# Patient Record
Sex: MALE | URBAN - METROPOLITAN AREA
[De-identification: names, ages, dates, MRNs, and addresses within clinical notes are randomized per-mention and may not be internally consistent; named-entity substitution may affect disease eponyms.]

---

## 2022-08-24 ENCOUNTER — APPOINTMENT (RX ONLY)
Dept: URBAN - METROPOLITAN AREA CLINIC 55 | Facility: CLINIC | Age: 42
Setting detail: DERMATOLOGY
End: 2022-08-24

## 2022-08-24 DIAGNOSIS — D22 MELANOCYTIC NEVI: ICD-10-CM | Status: STABLE

## 2022-08-24 DIAGNOSIS — L28.1 PRURIGO NODULARIS: ICD-10-CM

## 2022-08-24 PROBLEM — D22.5 MELANOCYTIC NEVI OF TRUNK: Status: ACTIVE | Noted: 2022-08-24

## 2022-08-24 PROCEDURE — ? ADDITIONAL NOTES

## 2022-08-24 PROCEDURE — ? COUNSELING

## 2022-08-24 PROCEDURE — ? DEFER

## 2022-08-24 PROCEDURE — 99203 OFFICE O/P NEW LOW 30 MIN: CPT

## 2022-08-24 ASSESSMENT — LOCATION SIMPLE DESCRIPTION DERM
LOCATION SIMPLE: CHEST
LOCATION SIMPLE: RIGHT HAND

## 2022-08-24 ASSESSMENT — LOCATION ZONE DERM
LOCATION ZONE: HAND
LOCATION ZONE: TRUNK

## 2022-08-24 ASSESSMENT — LOCATION DETAILED DESCRIPTION DERM
LOCATION DETAILED: LEFT LATERAL SUPERIOR CHEST
LOCATION DETAILED: RIGHT RADIAL DORSAL HAND

## 2022-08-24 NOTE — PROCEDURE: DEFER
X Size Of Lesion In Cm (Optional): 0
Instructions (Optional): Defer; [id number], 
Introduction Text (Please End With A Colon): Defer; [id number], 
Detail Level: Detailed

## 2022-08-24 NOTE — PROCEDURE: MIPS QUALITY
Quality 431: Preventive Care And Screening: Unhealthy Alcohol Use - Screening: Patient not identified as an unhealthy alcohol user when screened for unhealthy alcohol use using a systematic screening method
Detail Level: Generalized
Quality 226: Preventive Care And Screening: Tobacco Use: Screening And Cessation Intervention: Tobacco Screening not Performed for Unknown Reasons
Quality 130: Documentation Of Current Medications In The Medical Record: Current Medications Documented

## 2022-08-24 NOTE — PROCEDURE: ADDITIONAL NOTES
Render Risk Assessment In Note?: no
Additional Notes: Pt will be back in 2 weeks to get kenalog 10 injected on site 0.1cc
Detail Level: Simple

## 2022-09-07 ENCOUNTER — APPOINTMENT (RX ONLY)
Dept: URBAN - METROPOLITAN AREA CLINIC 55 | Facility: CLINIC | Age: 42
Setting detail: DERMATOLOGY
End: 2022-09-07

## 2022-09-07 DIAGNOSIS — L30.9 DERMATITIS, UNSPECIFIED: ICD-10-CM

## 2022-09-22 ENCOUNTER — APPOINTMENT (RX ONLY)
Dept: URBAN - METROPOLITAN AREA CLINIC 55 | Facility: CLINIC | Age: 42
Setting detail: DERMATOLOGY
End: 2022-09-22

## 2022-09-22 DIAGNOSIS — L81.4 OTHER MELANIN HYPERPIGMENTATION: ICD-10-CM | Status: STABLE

## 2022-09-22 DIAGNOSIS — L28.1 PRURIGO NODULARIS: ICD-10-CM

## 2022-09-22 PROCEDURE — 11900 INJECT SKIN LESIONS </W 7: CPT

## 2022-09-22 PROCEDURE — 99212 OFFICE O/P EST SF 10 MIN: CPT | Mod: 25

## 2022-09-22 PROCEDURE — ? INTRALESIONAL KENALOG

## 2022-09-22 PROCEDURE — ? COUNSELING

## 2022-09-22 ASSESSMENT — LOCATION DETAILED DESCRIPTION DERM
LOCATION DETAILED: RIGHT RADIAL DORSAL HAND
LOCATION DETAILED: LEFT POSTERIOR SHOULDER

## 2022-09-22 ASSESSMENT — LOCATION ZONE DERM
LOCATION ZONE: HAND
LOCATION ZONE: ARM

## 2022-09-22 ASSESSMENT — LOCATION SIMPLE DESCRIPTION DERM
LOCATION SIMPLE: LEFT SHOULDER
LOCATION SIMPLE: RIGHT HAND

## 2022-09-22 NOTE — HPI: EVALUATION OF SKIN LESION(S)
What Type Of Note Output Would You Prefer (Optional)?: Standard Output
How Severe Are Your Spot(S)?: mild
Have Your Spot(S) Been Treated In The Past?: has been treated
Hpi Title: Evaluation of Skin Lesions
When Was It Treated?: 9/7/22

## 2022-09-22 NOTE — PROCEDURE: INTRALESIONAL KENALOG
Which Kenalog Vial Was Used?: Kenalog 40 mg/ml (1 ml vial)
How Many Mls Were Removed From The 40 Mg/Ml (1ml) Vial When Preparing The Injectable Solution?: 0
Validate Note Data When Using Inventory: Yes
Detail Level: Detailed
Concentration Of Solution Injected (Mg/Ml): 40.0
Ndc# For Kenalog Only: 48707329466
Show Inventory Tab: Hide
Total Volume Injected (Ccs- Only Use Numbers And Decimals): 0.5
Kenalog Preparation: Kenalog
Administered By (Optional): [facility]
Include Z78.9 (Other Specified Conditions Influencing Health Status) As An Associated Diagnosis?: No
Medical Necessity Clause: This procedure was medically necessary because the lesions that were treated were: causing patient emotional distress
Consent: The risks of atrophy were reviewed with the patient.
Treatment Number (Optional): 2

## 2022-10-25 ENCOUNTER — APPOINTMENT (RX ONLY)
Dept: URBAN - METROPOLITAN AREA CLINIC 55 | Facility: CLINIC | Age: 42
Setting detail: DERMATOLOGY
End: 2022-10-25

## 2022-10-25 DIAGNOSIS — L28.1 PRURIGO NODULARIS: ICD-10-CM

## 2022-10-25 PROCEDURE — ? COUNSELING

## 2022-10-25 PROCEDURE — ? INTRALESIONAL KENALOG

## 2022-10-25 PROCEDURE — 11900 INJECT SKIN LESIONS </W 7: CPT

## 2022-10-25 ASSESSMENT — LOCATION DETAILED DESCRIPTION DERM: LOCATION DETAILED: RIGHT RADIAL DORSAL HAND

## 2022-10-25 ASSESSMENT — LOCATION SIMPLE DESCRIPTION DERM: LOCATION SIMPLE: RIGHT HAND

## 2022-10-25 ASSESSMENT — LOCATION ZONE DERM: LOCATION ZONE: HAND

## 2022-10-25 NOTE — PROCEDURE: INTRALESIONAL KENALOG
Which Kenalog Vial Was Used?: Kenalog 40 mg/ml (1 ml vial)
How Many Mls Were Removed From The 40 Mg/Ml (1ml) Vial When Preparing The Injectable Solution?: 0
Validate Note Data When Using Inventory: Yes
Detail Level: Detailed
Concentration Of Solution Injected (Mg/Ml): 40.0
Ndc# For Kenalog Only: [id number]
Show Inventory Tab: Hide
Total Volume Injected (Ccs- Only Use Numbers And Decimals): 0.5
Kenalog Preparation: Kenalog
Administered By (Optional): [facility]
Include Z78.9 (Other Specified Conditions Influencing Health Status) As An Associated Diagnosis?: No
Medical Necessity Clause: This procedure was medically necessary because the lesions that were treated were: causing patient emotional distress
Consent: The risks of atrophy were reviewed with the patient.
Treatment Number (Optional): 3

## 2022-11-22 ENCOUNTER — APPOINTMENT (RX ONLY)
Dept: URBAN - METROPOLITAN AREA CLINIC 55 | Facility: CLINIC | Age: 42
Setting detail: DERMATOLOGY
End: 2022-11-22

## 2022-11-22 DIAGNOSIS — L28.1 PRURIGO NODULARIS: ICD-10-CM | Status: RESOLVING

## 2022-11-22 PROCEDURE — ? PRESCRIPTION MEDICATION MANAGEMENT

## 2022-11-22 PROCEDURE — ? COUNSELING

## 2022-11-22 PROCEDURE — 99213 OFFICE O/P EST LOW 20 MIN: CPT

## 2022-11-22 ASSESSMENT — LOCATION DETAILED DESCRIPTION DERM: LOCATION DETAILED: RIGHT ULNAR DORSAL HAND

## 2022-11-22 ASSESSMENT — LOCATION SIMPLE DESCRIPTION DERM: LOCATION SIMPLE: RIGHT HAND

## 2022-11-22 ASSESSMENT — LOCATION ZONE DERM: LOCATION ZONE: HAND

## 2022-11-22 NOTE — PROCEDURE: PRESCRIPTION MEDICATION MANAGEMENT
Detail Level: Zone
Render In Strict Bullet Format?: No
Continue Regimen: Pt to continue using medication Triamcinolone as needed

## 2024-11-14 ENCOUNTER — APPOINTMENT (RX ONLY)
Dept: URBAN - METROPOLITAN AREA CLINIC 55 | Facility: CLINIC | Age: 44
Setting detail: DERMATOLOGY
End: 2024-11-14

## 2024-11-14 DIAGNOSIS — L72.0 EPIDERMAL CYST: ICD-10-CM

## 2024-11-14 DIAGNOSIS — L21.8 OTHER SEBORRHEIC DERMATITIS: ICD-10-CM

## 2024-11-14 DIAGNOSIS — D485 NEOPLASM OF UNCERTAIN BEHAVIOR OF SKIN: ICD-10-CM

## 2024-11-14 PROBLEM — D48.5 NEOPLASM OF UNCERTAIN BEHAVIOR OF SKIN: Status: ACTIVE | Noted: 2024-11-14

## 2024-11-14 PROCEDURE — ? DEFER

## 2024-11-14 PROCEDURE — ? PRESCRIPTION

## 2024-11-14 PROCEDURE — ? PRESCRIPTION MEDICATION MANAGEMENT

## 2024-11-14 PROCEDURE — ? COUNSELING

## 2024-11-14 PROCEDURE — 99213 OFFICE O/P EST LOW 20 MIN: CPT

## 2024-11-14 RX ORDER — KETOCONAZOLE 20 MG/ML
SHAMPOO, SUSPENSION TOPICAL TIW
Qty: 120 | Refills: 3 | Status: ERX | COMMUNITY
Start: 2024-11-14

## 2024-11-14 RX ADMIN — KETOCONAZOLE: 20 SHAMPOO, SUSPENSION TOPICAL at 00:00

## 2024-11-14 ASSESSMENT — LOCATION DETAILED DESCRIPTION DERM
LOCATION DETAILED: SUPERIOR THORACIC SPINE
LOCATION DETAILED: LEFT INFERIOR CENTRAL MALAR CHEEK
LOCATION DETAILED: LEFT CLAVICULAR NECK
LOCATION DETAILED: RIGHT RADIAL DORSAL HAND
LOCATION DETAILED: LEFT MEDIAL SUPERIOR CHEST
LOCATION DETAILED: HAIR

## 2024-11-14 ASSESSMENT — LOCATION ZONE DERM
LOCATION ZONE: SCALP
LOCATION ZONE: NECK
LOCATION ZONE: FACE
LOCATION ZONE: TRUNK
LOCATION ZONE: HAND

## 2024-11-14 ASSESSMENT — LOCATION SIMPLE DESCRIPTION DERM
LOCATION SIMPLE: UPPER BACK
LOCATION SIMPLE: CHEST
LOCATION SIMPLE: HAIR
LOCATION SIMPLE: LEFT ANTERIOR NECK
LOCATION SIMPLE: RIGHT HAND
LOCATION SIMPLE: LEFT CHEEK

## 2024-11-14 NOTE — PROCEDURE: PRESCRIPTION MEDICATION MANAGEMENT
Plan: Apply ketoconazole to face, scalp, chest, and back. Advised to apply aquaphor nightly
Render In Strict Bullet Format?: No
Detail Level: Zone

## 2024-11-14 NOTE — PROCEDURE: DEFER
Introduction Text (Please End With A Colon): Defer biopsy until prior authorization approved
X Size Of Lesion In Cm (Optional): 0
Detail Level: Detailed

## 2024-12-10 ENCOUNTER — APPOINTMENT (OUTPATIENT)
Dept: URBAN - METROPOLITAN AREA CLINIC 55 | Facility: CLINIC | Age: 44
Setting detail: DERMATOLOGY
End: 2024-12-10